# Patient Record
Sex: FEMALE | Race: ASIAN | NOT HISPANIC OR LATINO | Employment: FULL TIME | URBAN - METROPOLITAN AREA
[De-identification: names, ages, dates, MRNs, and addresses within clinical notes are randomized per-mention and may not be internally consistent; named-entity substitution may affect disease eponyms.]

---

## 2018-06-07 ENCOUNTER — OFFICE VISIT (OUTPATIENT)
Dept: MEDICAL GROUP | Facility: MEDICAL CENTER | Age: 23
End: 2018-06-07
Payer: COMMERCIAL

## 2018-06-07 VITALS
DIASTOLIC BLOOD PRESSURE: 70 MMHG | TEMPERATURE: 98.4 F | RESPIRATION RATE: 16 BRPM | WEIGHT: 119 LBS | SYSTOLIC BLOOD PRESSURE: 114 MMHG | OXYGEN SATURATION: 98 % | BODY MASS INDEX: 23.99 KG/M2 | HEART RATE: 108 BPM | HEIGHT: 59 IN

## 2018-06-07 DIAGNOSIS — L50.9 HIVES: ICD-10-CM

## 2018-06-07 DIAGNOSIS — G44.209 TENSION HEADACHE: ICD-10-CM

## 2018-06-07 DIAGNOSIS — J02.9 SORE THROAT: ICD-10-CM

## 2018-06-07 PROBLEM — Z87.19 HISTORY OF GI BLEED: Status: ACTIVE | Noted: 2018-06-07

## 2018-06-07 PROBLEM — D50.0 IRON DEFICIENCY ANEMIA DUE TO CHRONIC BLOOD LOSS: Status: ACTIVE | Noted: 2018-06-07

## 2018-06-07 PROBLEM — D50.8 IRON DEFICIENCY ANEMIA SECONDARY TO INADEQUATE DIETARY IRON INTAKE: Status: ACTIVE | Noted: 2018-06-07

## 2018-06-07 PROBLEM — Z91.09 ENVIRONMENTAL ALLERGIES: Status: ACTIVE | Noted: 2018-06-07

## 2018-06-07 PROCEDURE — 99204 OFFICE O/P NEW MOD 45 MIN: CPT | Performed by: NURSE PRACTITIONER

## 2018-06-07 ASSESSMENT — PATIENT HEALTH QUESTIONNAIRE - PHQ9: CLINICAL INTERPRETATION OF PHQ2 SCORE: 0

## 2018-06-07 NOTE — PROGRESS NOTES
Aysha Henriquez is a 22 y.o. female here to establish care and discuss the following:     HPI:    Tension headache  Began with right shoulder pain about 2 months ago. Pain then caused neck tension which then caused some tension headaches. She reports that her shoulder pain and neck pain have resolved and headache frequency has lessened, but she is still getting headaches about once per week. Has been taking tylenol as needed for headache which helps but headaches are still returning weekly.     Hives  Patient experienced a hive rash on her arms and thighs after swimming a a public pool last week. Associated itching and redness. She had been in the pool once before and did not have issues. She took benadryl that night and the rash had completely resolved by morning. Denies recent illness, fevers, chills, or body aches. She has not visited the pool since and has not had recurrence of symptoms.    Sore throat  Patient reports a sore throat for the past 2 days. Also having some sinus congestion and runny nose. She take allegra daily for the past 6 years for allergies. She denies fevers or lethargy. She feels generally well. Has a mild, non productive cough.     Current medicines (including changes today)  No current outpatient prescriptions on file.     No current facility-administered medications for this visit.      She  has a past medical history of Allergy; Anemia; Blood transfusion without reported diagnosis; and GI bleeding (01/2010).  She  has no past surgical history on file.  Social History   Substance Use Topics   • Smoking status: Never Smoker   • Smokeless tobacco: Never Used   • Alcohol use Yes      Comment: 2-8 drinks per week     Social History     Social History Narrative   • No narrative on file     Family History   Problem Relation Age of Onset   • Arthritis Mother    • Hypertension Mother    • Hypertension Father    • Hyperlipidemia Father    • Diabetes Brother      type 2     Family Status   Relation  "Status   • Mother Alive   • Father Alive   • Brother Alive         ROS  No chest pain, no abdominal pain, no rash.  Positive ROS as per HPI.  All other systems reviewed and are negative      Objective:     Blood pressure 114/70, pulse (!) 108, temperature 36.9 °C (98.4 °F), resp. rate 16, height 1.499 m (4' 11\"), weight 54 kg (119 lb), last menstrual period 05/16/2018, SpO2 98 %, not currently breastfeeding. Body mass index is 24.04 kg/m².  Physical Exam:    Constitutional: Alert, no distress.  Skin: Warm, dry, good turgor, no rashes in visible areas.  Eye: Equal, round and reactive, conjunctiva clear, lids normal.  ENMT: Lips without lesions, good dentition, oropharynx clear.  Neck: Trachea midline, no masses, no thyromegaly. No cervical or supraclavicular lymphadenopathy.  Respiratory: Unlabored respiratory effort, lungs clear to auscultation, no wheezes, no ronchi.  Cardiovascular: Normal S1, S2, no murmur, no edema.  Abdomen: Soft, non-tender, no masses, no hepatosplenomegaly.  Psych: Alert and oriented x3, normal affect and mood.        Assessment and Plan:   The following treatment plan was discussed    1. Tension headache  Unstable, improving.  Advised continuing tylenol as needed for pain, scheduling an appointment with massage therapy for neck and shoulder tension.   Referral placed for physical therapy  Recommend massage hook/cane for home trigger point massage.   - REFERRAL TO PHYSICAL THERAPY Reason for Therapy: Eval/Treat/Report    2. Hives  Stable.   Advised benadryl or daytime allergy medication if symptoms return after returning to pool.   Likely chlorine sensitivity/allergy or exposure to something at pool.   If symptoms return from pool, avoid exposure.   If symptoms return and has not gone to pool, return to office.     3. Sore throat  Unstable.   Likely allergies.   Advised patient to switch allergy medication to Claritin or Zyrtec since she has been on Allegra for 6 years.  Flonase nightly for " 1-2 weeks for postnasal drip.      Followup: Return if symptoms worsen or fail to improve.    I have placed the below orders and discussed them with an approved delegating provider. The MA is performing the below orders under the direction of Dr. Bustillo

## 2018-06-07 NOTE — ASSESSMENT & PLAN NOTE
Began with right shoulder pain about 2 months ago. Pain then caused neck tension which then caused some tension headaches. She reports that her shoulder pain and neck pain have resolved and headache frequency has lessened, but she is still getting headaches about once per week. Has been taking tylenol as needed for headache which helps but headaches are still returning weekly.

## 2018-06-07 NOTE — ASSESSMENT & PLAN NOTE
Patient experienced a hive rash on her arms and thighs after swimming a a public pool last week. Associated itching and redness. She had been in the pool once before and did not have issues. She took benadryl that night and the rash had completely resolved by morning. Denies recent illness, fevers, chills, or body aches. She has not visited the pool since and has not had recurrence of symptoms.

## 2018-06-07 NOTE — ASSESSMENT & PLAN NOTE
Patient reports a sore throat for the past 2 days. Also having some sinus congestion and runny nose. She take allegra daily for the past 6 years for allergies. She denies fevers or lethargy. She feels generally well. Has a mild, non productive cough.

## 2018-10-11 ENCOUNTER — HOSPITAL ENCOUNTER (OUTPATIENT)
Dept: LAB | Facility: MEDICAL CENTER | Age: 23
End: 2018-10-11
Attending: PHYSICIAN ASSISTANT
Payer: COMMERCIAL

## 2018-10-11 PROCEDURE — 88175 CYTOPATH C/V AUTO FLUID REDO: CPT

## 2018-10-12 LAB — CYTOLOGY REG CYTOL: NORMAL

## 2019-10-17 ENCOUNTER — HOSPITAL ENCOUNTER (OUTPATIENT)
Dept: LAB | Facility: MEDICAL CENTER | Age: 24
End: 2019-10-17
Attending: PHYSICIAN ASSISTANT
Payer: COMMERCIAL

## 2019-10-17 LAB — CYTOLOGY REG CYTOL: NORMAL

## 2019-10-17 PROCEDURE — 87624 HPV HI-RISK TYP POOLED RSLT: CPT

## 2019-10-17 PROCEDURE — 88175 CYTOPATH C/V AUTO FLUID REDO: CPT

## 2019-10-22 LAB
HPV HR 12 DNA CVX QL NAA+PROBE: POSITIVE
HPV16 DNA SPEC QL NAA+PROBE: NEGATIVE
HPV18 DNA SPEC QL NAA+PROBE: NEGATIVE
SPECIMEN SOURCE: ABNORMAL

## 2020-01-02 ENCOUNTER — HOSPITAL ENCOUNTER (OUTPATIENT)
Dept: LAB | Facility: MEDICAL CENTER | Age: 25
End: 2020-01-02
Attending: PHYSICIAN ASSISTANT
Payer: COMMERCIAL

## 2020-01-02 LAB — PATHOLOGY CONSULT NOTE: NORMAL

## 2020-01-02 PROCEDURE — 88305 TISSUE EXAM BY PATHOLOGIST: CPT

## 2020-01-03 ENCOUNTER — OFFICE VISIT (OUTPATIENT)
Dept: MEDICAL GROUP | Facility: MEDICAL CENTER | Age: 25
End: 2020-01-03
Payer: COMMERCIAL

## 2020-01-03 VITALS
RESPIRATION RATE: 16 BRPM | TEMPERATURE: 98.6 F | BODY MASS INDEX: 26.21 KG/M2 | SYSTOLIC BLOOD PRESSURE: 102 MMHG | HEIGHT: 59 IN | DIASTOLIC BLOOD PRESSURE: 60 MMHG | HEART RATE: 83 BPM | WEIGHT: 130 LBS | OXYGEN SATURATION: 98 %

## 2020-01-03 DIAGNOSIS — Z23 NEED FOR VACCINATION: ICD-10-CM

## 2020-01-03 DIAGNOSIS — Z00.00 ANNUAL PHYSICAL EXAM: ICD-10-CM

## 2020-01-03 DIAGNOSIS — D50.0 IRON DEFICIENCY ANEMIA DUE TO CHRONIC BLOOD LOSS: ICD-10-CM

## 2020-01-03 DIAGNOSIS — Z83.3 FAMILY HISTORY OF DIABETES MELLITUS IN BROTHER: ICD-10-CM

## 2020-01-03 PROBLEM — J02.9 SORE THROAT: Status: RESOLVED | Noted: 2018-06-07 | Resolved: 2020-01-03

## 2020-01-03 PROBLEM — Z97.5 IUD (INTRAUTERINE DEVICE) IN PLACE: Status: ACTIVE | Noted: 2020-01-03

## 2020-01-03 PROBLEM — G44.209 TENSION HEADACHE: Status: RESOLVED | Noted: 2018-06-07 | Resolved: 2020-01-03

## 2020-01-03 PROBLEM — L50.9 HIVES: Status: RESOLVED | Noted: 2018-06-07 | Resolved: 2020-01-03

## 2020-01-03 PROCEDURE — 90471 IMMUNIZATION ADMIN: CPT | Performed by: NURSE PRACTITIONER

## 2020-01-03 PROCEDURE — 99395 PREV VISIT EST AGE 18-39: CPT | Mod: 25 | Performed by: NURSE PRACTITIONER

## 2020-01-03 PROCEDURE — 90715 TDAP VACCINE 7 YRS/> IM: CPT | Performed by: NURSE PRACTITIONER

## 2020-01-03 RX ORDER — VALACYCLOVIR HYDROCHLORIDE 500 MG/1
TABLET, FILM COATED ORAL
COMMUNITY
Start: 2019-10-17

## 2020-01-03 ASSESSMENT — PATIENT HEALTH QUESTIONNAIRE - PHQ9: CLINICAL INTERPRETATION OF PHQ2 SCORE: 0

## 2020-01-03 NOTE — ASSESSMENT & PLAN NOTE
Social/Family: Single, no children  Work: Supervisor, production line, [employer], Full time  Diet: Vegetables daily, Lean meat, fish, red meat once weekly  Caffeine/Energy Drinks/Soda: 1 cup coffee daily. No ED or soda  Exercise: None  Stress: Normal life stress  Sleep: No issues  Depression/Anxiety Concerns: Some anxiety, seeing therpist

## 2020-01-03 NOTE — PROGRESS NOTES
"Subjective:   Aysha Henriquez is a 24 y.o. female here today for annual physical and vaccine:    Annual physical exam  Social/Family: Single, no children  Work: Supervisor, production line, [employer], Full time  Diet: Vegetables daily, Lean meat, fish, red meat once weekly  Caffeine/Energy Drinks/Soda: 1 cup coffee daily. No ED or soda  Exercise: None  Stress: Normal life stress  Sleep: No issues  Depression/Anxiety Concerns: Some anxiety, seeing therpist      Iron deficiency anemia due to chronic blood loss  Chronic, due to heavy menses. Now has Kyleena IUD and has not had issues with heavy bleeding.        Current medicines (including changes today)  Current Outpatient Medications   Medication Sig Dispense Refill   • Levonorgestrel 19.5 MG IUD by Intrauterine route.     • valACYclovir (VALTREX) 500 MG Tab TAKE 1 TABLET BY MOUTH ONCE DAILY (FOR SUPPRESSION THERAPY)       No current facility-administered medications for this visit.      She  has a past medical history of Allergy, Anemia, Blood transfusion without reported diagnosis, and GI bleeding (01/2010).    ROS   No chest pain, no shortness of breath, no abdominal pain  Positive ROS as per HPI.  All other systems reviewed and are negative.     Objective:     /60 (BP Location: Right arm, Patient Position: Sitting, BP Cuff Size: Adult)   Pulse 83   Temp 37 °C (98.6 °F) (Temporal)   Resp 16   Ht 1.499 m (4' 11\")   Wt 59 kg (130 lb)   SpO2 98%  Body mass index is 26.26 kg/m².     Physical Exam:  Constitutional: Alert, no distress.  Skin: Warm, dry, good turgor, no rashes in visible areas.  Eye: Equal, round and reactive, conjunctiva clear, lids normal.  ENMT: Lips without lesions, good dentition, oropharynx clear.  Neck: Trachea midline, no masses, no thyromegaly. No cervical or supraclavicular lymphadenopathy  Respiratory: Unlabored respiratory effort, lungs clear to auscultation, no wheezes, no ronchi.  Cardiovascular: Normal S1, S2, no murmur, no " edema.  Abdomen: Soft, non-tender, no masses, no hepatosplenomegaly.  Psych: Alert and oriented x3, normal affect and mood.      Assessment and Plan:   The following treatment plan was discussed    1. Annual physical exam  Patient and I discussed the importance of lifestyle changes, with particular emphasis on decreasing sugar and carbohydrate intake and increasing plant-based nutrition (for the purposes of weight loss, general health, and prevention of chronic illnesses), as well as regular cardiovascular exercise, proper sleep, and stress management. Patient verbalized understanding.  Check labs, call with results  - HEMOGLOBIN A1C; Future  - IRON/TOTAL IRON BIND; Future  - FERRITIN; Future    2. Family history of diabetes mellitus in brother  - HEMOGLOBIN A1C; Future    3. Iron deficiency anemia due to chronic blood loss  Stable  Check iron levels  - IRON/TOTAL IRON BIND; Future  - FERRITIN; Future    4. Need for vaccination  Patient had HPV vaccines in her teens, she will get these records  - Tdap =>6yo IM      Followup: Return in about 1 year (around 1/3/2021).    I have placed the below orders and discussed them with an approved delegating provider. The MA is performing the below orders under the direction of Dr. Bustillo

## 2020-01-08 ENCOUNTER — TELEPHONE (OUTPATIENT)
Dept: MEDICAL GROUP | Facility: MEDICAL CENTER | Age: 25
End: 2020-01-08

## 2020-02-21 ENCOUNTER — HOSPITAL ENCOUNTER (OUTPATIENT)
Dept: LAB | Facility: MEDICAL CENTER | Age: 25
End: 2020-02-21
Attending: OBSTETRICS & GYNECOLOGY
Payer: COMMERCIAL

## 2020-02-21 LAB — PATHOLOGY CONSULT NOTE: NORMAL

## 2020-02-21 PROCEDURE — 88307 TISSUE EXAM BY PATHOLOGIST: CPT

## 2020-02-21 PROCEDURE — 88305 TISSUE EXAM BY PATHOLOGIST: CPT

## 2020-03-08 ENCOUNTER — APPOINTMENT (OUTPATIENT)
Dept: URGENT CARE | Facility: PHYSICIAN GROUP | Age: 25
End: 2020-03-08
Payer: COMMERCIAL

## 2020-06-18 ENCOUNTER — HOSPITAL ENCOUNTER (OUTPATIENT)
Dept: LAB | Facility: MEDICAL CENTER | Age: 25
End: 2020-06-18
Attending: PHYSICIAN ASSISTANT
Payer: COMMERCIAL

## 2020-06-18 PROCEDURE — 88175 CYTOPATH C/V AUTO FLUID REDO: CPT

## 2020-06-18 PROCEDURE — 87624 HPV HI-RISK TYP POOLED RSLT: CPT

## 2020-06-19 LAB
CYTOLOGY REG CYTOL: NORMAL
HPV HR 12 DNA CVX QL NAA+PROBE: NEGATIVE
HPV16 DNA SPEC QL NAA+PROBE: NEGATIVE
HPV18 DNA SPEC QL NAA+PROBE: NEGATIVE
SPECIMEN SOURCE: NORMAL

## 2020-11-12 ENCOUNTER — HOSPITAL ENCOUNTER (OUTPATIENT)
Dept: LAB | Facility: MEDICAL CENTER | Age: 25
End: 2020-11-12
Attending: PHYSICIAN ASSISTANT
Payer: COMMERCIAL

## 2020-11-12 PROCEDURE — 88175 CYTOPATH C/V AUTO FLUID REDO: CPT

## 2020-11-12 PROCEDURE — 87624 HPV HI-RISK TYP POOLED RSLT: CPT

## 2020-12-07 ENCOUNTER — HOSPITAL ENCOUNTER (OUTPATIENT)
Facility: MEDICAL CENTER | Age: 25
End: 2020-12-07
Attending: OBSTETRICS & GYNECOLOGY
Payer: COMMERCIAL

## 2020-12-07 PROCEDURE — 88305 TISSUE EXAM BY PATHOLOGIST: CPT | Mod: 59

## 2020-12-08 LAB — PATHOLOGY CONSULT NOTE: NORMAL

## 2021-01-21 ENCOUNTER — HOSPITAL ENCOUNTER (OUTPATIENT)
Dept: LAB | Facility: MEDICAL CENTER | Age: 26
End: 2021-01-21
Attending: OBSTETRICS & GYNECOLOGY
Payer: COMMERCIAL

## 2021-01-21 LAB — PATHOLOGY CONSULT NOTE: NORMAL

## 2021-01-21 PROCEDURE — 88307 TISSUE EXAM BY PATHOLOGIST: CPT

## 2021-07-08 ENCOUNTER — HOSPITAL ENCOUNTER (OUTPATIENT)
Dept: LAB | Facility: MEDICAL CENTER | Age: 26
End: 2021-07-08
Attending: OBSTETRICS & GYNECOLOGY
Payer: COMMERCIAL

## 2021-07-08 LAB — CYTOLOGY REG CYTOL: NORMAL

## 2021-07-08 PROCEDURE — 87624 HPV HI-RISK TYP POOLED RSLT: CPT

## 2021-07-08 PROCEDURE — 88175 CYTOPATH C/V AUTO FLUID REDO: CPT

## 2021-07-17 LAB
HPV HR 12 DNA CVX QL NAA+PROBE: NEGATIVE
HPV16 DNA SPEC QL NAA+PROBE: NEGATIVE
HPV18 DNA SPEC QL NAA+PROBE: NEGATIVE
SPECIMEN SOURCE: NORMAL

## 2022-05-05 ENCOUNTER — HOSPITAL ENCOUNTER (OUTPATIENT)
Facility: MEDICAL CENTER | Age: 27
End: 2022-05-05
Attending: PHYSICIAN ASSISTANT
Payer: COMMERCIAL

## 2022-05-05 PROCEDURE — 87591 N.GONORRHOEAE DNA AMP PROB: CPT

## 2022-05-05 PROCEDURE — 87491 CHLMYD TRACH DNA AMP PROBE: CPT

## 2022-05-06 LAB
C TRACH DNA GENITAL QL NAA+PROBE: NEGATIVE
N GONORRHOEA DNA GENITAL QL NAA+PROBE: NEGATIVE
SPECIMEN SOURCE: NORMAL

## 2022-07-28 ENCOUNTER — HOSPITAL ENCOUNTER (OUTPATIENT)
Dept: LAB | Facility: MEDICAL CENTER | Age: 27
End: 2022-07-28
Attending: PHYSICIAN ASSISTANT
Payer: COMMERCIAL

## 2022-07-28 PROCEDURE — 87624 HPV HI-RISK TYP POOLED RSLT: CPT

## 2022-07-28 PROCEDURE — 88175 CYTOPATH C/V AUTO FLUID REDO: CPT
